# Patient Record
Sex: FEMALE | URBAN - METROPOLITAN AREA
[De-identification: names, ages, dates, MRNs, and addresses within clinical notes are randomized per-mention and may not be internally consistent; named-entity substitution may affect disease eponyms.]

---

## 2024-09-17 ENCOUNTER — APPOINTMENT (RX ONLY)
Dept: URBAN - METROPOLITAN AREA CLINIC 45 | Facility: CLINIC | Age: 74
Setting detail: DERMATOLOGY
End: 2024-09-17

## 2024-09-17 DIAGNOSIS — L80 VITILIGO: ICD-10-CM | Status: INADEQUATELY CONTROLLED

## 2024-09-17 PROCEDURE — ? OPZELURA INITIATION

## 2024-09-17 PROCEDURE — 99204 OFFICE O/P NEW MOD 45 MIN: CPT

## 2024-09-17 PROCEDURE — ? PRESCRIPTION

## 2024-09-17 PROCEDURE — ? COUNSELING

## 2024-09-17 PROCEDURE — ? PRESCRIPTION MEDICATION MANAGEMENT

## 2024-09-17 PROCEDURE — ? PHOTO-DOCUMENTATION

## 2024-09-17 RX ORDER — TRIAMCINOLONE ACETONIDE 1 MG/G
CREAM TOPICAL BID
Qty: 454 | Refills: 1 | Status: ERX | COMMUNITY
Start: 2024-09-17

## 2024-09-17 RX ORDER — RUXOLITINIB 15 MG/G
CREAM TOPICAL
Qty: 120 | Refills: 3 | Status: ERX | COMMUNITY
Start: 2024-09-17

## 2024-09-17 RX ORDER — TACROLIMUS 1 MG/G
OINTMENT TOPICAL
Qty: 100 | Refills: 1 | Status: ERX | COMMUNITY
Start: 2024-09-17

## 2024-09-17 RX ORDER — PREDNISONE 10 MG/1
TABLET ORAL
Qty: 35 | Refills: 0 | Status: ERX | COMMUNITY
Start: 2024-09-17

## 2024-09-17 RX ADMIN — PREDNISONE: 10 TABLET ORAL at 00:00

## 2024-09-17 RX ADMIN — TRIAMCINOLONE ACETONIDE: 1 CREAM TOPICAL at 00:00

## 2024-09-17 RX ADMIN — RUXOLITINIB: 15 CREAM TOPICAL at 00:00

## 2024-09-17 RX ADMIN — TACROLIMUS: 1 OINTMENT TOPICAL at 00:00

## 2024-09-17 ASSESSMENT — LOCATION SIMPLE DESCRIPTION DERM
LOCATION SIMPLE: LEFT HAND
LOCATION SIMPLE: LEFT FOREARM
LOCATION SIMPLE: LEFT CHEEK
LOCATION SIMPLE: RIGHT CHEEK
LOCATION SIMPLE: RIGHT HAND
LOCATION SIMPLE: RIGHT FOREARM

## 2024-09-17 ASSESSMENT — LOCATION DETAILED DESCRIPTION DERM
LOCATION DETAILED: LEFT PROXIMAL DORSAL FOREARM
LOCATION DETAILED: LEFT ULNAR DORSAL HAND
LOCATION DETAILED: RIGHT RADIAL DORSAL HAND
LOCATION DETAILED: RIGHT INFERIOR CENTRAL MALAR CHEEK
LOCATION DETAILED: LEFT SUPERIOR CENTRAL BUCCAL CHEEK
LOCATION DETAILED: RIGHT VENTRAL PROXIMAL FOREARM
LOCATION DETAILED: RIGHT PROXIMAL DORSAL FOREARM
LOCATION DETAILED: LEFT VENTRAL PROXIMAL FOREARM

## 2024-09-17 ASSESSMENT — LOCATION ZONE DERM
LOCATION ZONE: ARM
LOCATION ZONE: FACE
LOCATION ZONE: HAND

## 2024-09-17 NOTE — HPI: DISCOLORATION (VITILIGO)
How Severe Is It?: severe
Is This A New Presentation, Or A Follow-Up?: Discoloration
Additional History: Pt states she has had this condition for about 20 years, she states it started on her hand and arms, since then the pt has been under a lot of stress and she has since then noticed it progressing to her face. She saw a Dr years ago at [facility] and they would treat her with a machine and she got some re-pigment in her ear.

## 2024-09-17 NOTE — PROCEDURE: OPZELURA INITIATION
Pregnancy And Lactation Warning Text: There is insufficient data to evaluate drug-associated risk for major birth defects, miscarriage, or other adverse maternal or fetal outcomes.  There is a pregnancy registry that monitors pregnancy outcomes in pregnant persons exposed to the medication during pregnancy.  It is unknown if this medication is excreted in breast milk.  Do not breastfeed during treatment and for about 4 weeks after the last dose.
Is Mycophenalate Contraindicated?: No
Opzelura Monitoring Guidelines: There is no laboratory monitoring requirement with Opzelura.
Diagnosis (Required): Vitiligo
Detail Level: Zone
Opzelura Dosing: Apply twice daily to the affected areas

## 2024-09-17 NOTE — PROCEDURE: PRESCRIPTION MEDICATION MANAGEMENT
Detail Level: Zone
Plan: We will refer pt out to Dr Zuñiga to begin treatment with extract laser\\nPt can also talk to Edna Farmer to get on clinical trials
Initiate Treatment: triamcinolone acetonide 0.1 % topical cream BID\\nQuantity: 454.0 g\\nSig: Apply twice daily to AA Mon-Thurs\\n\\ntacrolimus 0.1 % topical ointment \\nQuantity: 100.0 g  Days Supply: 30\\nSig: Apply to aa on face bid Friday- Sunday\\n\\nOpzelura 1.5 % topical cream \\nQuantity: 120.0 g  Days Supply: 30\\nSig: Apply to affected areas once daily.
Render In Strict Bullet Format?: No